# Patient Record
Sex: MALE | Race: WHITE | NOT HISPANIC OR LATINO | Employment: OTHER | URBAN - NONMETROPOLITAN AREA
[De-identification: names, ages, dates, MRNs, and addresses within clinical notes are randomized per-mention and may not be internally consistent; named-entity substitution may affect disease eponyms.]

---

## 2021-05-26 ENCOUNTER — APPOINTMENT (EMERGENCY)
Dept: CT IMAGING | Facility: HOSPITAL | Age: 86
End: 2021-05-26
Payer: MEDICARE

## 2021-05-26 ENCOUNTER — APPOINTMENT (EMERGENCY)
Dept: RADIOLOGY | Facility: HOSPITAL | Age: 86
End: 2021-05-26
Payer: MEDICARE

## 2021-05-26 ENCOUNTER — HOSPITAL ENCOUNTER (EMERGENCY)
Facility: HOSPITAL | Age: 86
Discharge: HOME | End: 2021-05-26
Attending: EMERGENCY MEDICINE
Payer: MEDICARE

## 2021-05-26 VITALS
TEMPERATURE: 96.2 F | DIASTOLIC BLOOD PRESSURE: 51 MMHG | SYSTOLIC BLOOD PRESSURE: 107 MMHG | HEART RATE: 66 BPM | OXYGEN SATURATION: 100 % | RESPIRATION RATE: 14 BRPM | WEIGHT: 124.56 LBS

## 2021-05-26 DIAGNOSIS — S51.001A AVULSION OF SKIN OF ELBOW, RIGHT, INITIAL ENCOUNTER: ICD-10-CM
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
## 2021-05-26 DIAGNOSIS — N18.9 CKD (CHRONIC KIDNEY DISEASE): ICD-10-CM

## 2021-05-26 DIAGNOSIS — S09.90XA INJURY OF HEAD, INITIAL ENCOUNTER: ICD-10-CM

## 2021-05-26 DIAGNOSIS — D72.829 LEUKOCYTOSIS: ICD-10-CM

## 2021-05-26 LAB
ANION GAP SERPL CALCULATED.3IONS-SCNC: 8 MMOL/L (ref 4–13)
APTT PPP: 32 SECONDS (ref 23–37)
BASOPHILS # BLD AUTO: 0.12 THOUSANDS/ΜL (ref 0–0.1)
BASOPHILS NFR BLD AUTO: 1 % (ref 0–1)
BUN SERPL-MCNC: 31 MG/DL (ref 5–25)
CALCIUM SERPL-MCNC: 9.4 MG/DL (ref 8.3–10.1)
CHLORIDE SERPL-SCNC: 107 MMOL/L (ref 100–108)
CO2 SERPL-SCNC: 26 MMOL/L (ref 21–32)
CREAT SERPL-MCNC: 1.84 MG/DL (ref 0.6–1.3)
EOSINOPHIL # BLD AUTO: 0.29 THOUSAND/ΜL (ref 0–0.61)
EOSINOPHIL NFR BLD AUTO: 2 % (ref 0–6)
ERYTHROCYTE [DISTWIDTH] IN BLOOD BY AUTOMATED COUNT: 12.4 % (ref 11.6–15.1)
GFR SERPL CREATININE-BSD FRML MDRD: 33 ML/MIN/1.73SQ M
GLUCOSE SERPL-MCNC: 139 MG/DL (ref 65–140)
HCT VFR BLD AUTO: 37.7 % (ref 36.5–49.3)
HGB BLD-MCNC: 12.3 G/DL (ref 12–17)
IMM GRANULOCYTES # BLD AUTO: 0.07 THOUSAND/UL (ref 0–0.2)
IMM GRANULOCYTES NFR BLD AUTO: 0 % (ref 0–2)
INR PPP: 1.24 (ref 0.84–1.19)
LYMPHOCYTES # BLD AUTO: 3.3 THOUSANDS/ΜL (ref 0.6–4.47)
LYMPHOCYTES NFR BLD AUTO: 21 % (ref 14–44)
MCH RBC QN AUTO: 33.2 PG (ref 26.8–34.3)
MCHC RBC AUTO-ENTMCNC: 32.6 G/DL (ref 31.4–37.4)
MCV RBC AUTO: 102 FL (ref 82–98)
MONOCYTES # BLD AUTO: 0.93 THOUSAND/ΜL (ref 0.17–1.22)
MONOCYTES NFR BLD AUTO: 6 % (ref 4–12)
NEUTROPHILS # BLD AUTO: 10.91 THOUSANDS/ΜL (ref 1.85–7.62)
NEUTS SEG NFR BLD AUTO: 70 % (ref 43–75)
NRBC BLD AUTO-RTO: 0 /100 WBCS
PLATELET # BLD AUTO: 243 THOUSANDS/UL (ref 149–390)
PMV BLD AUTO: 9.4 FL (ref 8.9–12.7)
POTASSIUM SERPL-SCNC: 4 MMOL/L (ref 3.5–5.3)
PROTHROMBIN TIME: 15.4 SECONDS (ref 11.6–14.5)
RBC # BLD AUTO: 3.7 MILLION/UL (ref 3.88–5.62)
SODIUM SERPL-SCNC: 141 MMOL/L (ref 136–145)
WBC # BLD AUTO: 15.62 THOUSAND/UL (ref 4.31–10.16)

## 2021-05-26 PROCEDURE — 71045 X-RAY EXAM CHEST 1 VIEW: CPT

## 2021-05-26 PROCEDURE — 80048 BASIC METABOLIC PNL TOTAL CA: CPT | Performed by: EMERGENCY MEDICINE

## 2021-05-26 PROCEDURE — 93005 ELECTROCARDIOGRAM TRACING: CPT

## 2021-05-26 PROCEDURE — 99285 EMERGENCY DEPT VISIT HI MDM: CPT | Performed by: EMERGENCY MEDICINE

## 2021-05-26 PROCEDURE — 70450 CT HEAD/BRAIN W/O DYE: CPT

## 2021-05-26 PROCEDURE — 85610 PROTHROMBIN TIME: CPT | Performed by: EMERGENCY MEDICINE

## 2021-05-26 PROCEDURE — 85025 COMPLETE CBC W/AUTO DIFF WBC: CPT | Performed by: EMERGENCY MEDICINE

## 2021-05-26 PROCEDURE — 36415 COLL VENOUS BLD VENIPUNCTURE: CPT | Performed by: EMERGENCY MEDICINE

## 2021-05-26 PROCEDURE — 72125 CT NECK SPINE W/O DYE: CPT

## 2021-05-26 PROCEDURE — 85730 THROMBOPLASTIN TIME PARTIAL: CPT | Performed by: EMERGENCY MEDICINE

## 2021-05-26 PROCEDURE — 73080 X-RAY EXAM OF ELBOW: CPT

## 2021-05-26 PROCEDURE — 72170 X-RAY EXAM OF PELVIS: CPT

## 2021-05-26 PROCEDURE — 99284 EMERGENCY DEPT VISIT MOD MDM: CPT

## 2021-05-26 RX ORDER — LEVOTHYROXINE SODIUM 0.07 MG/1
75 TABLET ORAL
COMMUNITY

## 2021-05-26 RX ORDER — LISINOPRIL 10 MG/1
10 TABLET ORAL DAILY
COMMUNITY

## 2021-05-26 RX ORDER — MEMANTINE HYDROCHLORIDE 10 MG/1
10 TABLET ORAL 2 TIMES DAILY
COMMUNITY

## 2021-05-26 RX ORDER — MIRTAZAPINE 7.5 MG/1
7.5 TABLET, FILM COATED ORAL
COMMUNITY

## 2021-05-26 RX ORDER — TRIAMCINOLONE ACETONIDE 0.25 MG/G
1 CREAM TOPICAL 2 TIMES DAILY
COMMUNITY

## 2021-05-27 NOTE — ED PROVIDER NOTES
Emergency Department Trauma Note  Iwona Truong 82 y o  male MRN: [id number]  Unit/Bed#: ED 02/ED 02 Encounter: [id number]      Trauma Alert: Trauma Acuity: Trauma Evaluation  Model of Arrival: Mode of Arrival: ALS via Trauma Squad Name and Number: [facility]  Trauma Team: Current Providers  Attending Provider: Judeen Lesches, DO  Registered Nurse: Sam Salter RN  Consultants: None      History of Present Illness     Chief Complaint:   Chief Complaint   Patient presents with    Fall     Patient from home, hx of dementia  Was assisted to shower by son and then placed on toilet  Patient fell off toilet, skin tear right elbow and bruising near right ear  HPI:  Iwona Truong is a 82 y o  male who presents with fall  Mechanism:Details of Incident: Patient from home, hx of dementia  Was assisted to shower by son and then placed on toilet  Patient fell off toilet, skin tear right elbow and bruising near right ear  Injury Date: 05/26/21 Injury Time: 2045 Injury Occurence Location - [address] [place] [address] Way: [facility]    Patient is an 53-year-old male with history of dementia on no anticoagulants presents the emergency department after unwitnessed fall either while walking or from the toilet the patient's son reports that he had given his father a shower and then sat him on the toilet and then was out of the room when he heard a thud found father on the floor with a skin tear on the right elbow no other obvious injury or trauma  Patient ambulatory since the fall no complaint or sign of pain or trauma other than the skin tear on the right elbow  History provided by:  Patient and relative  Fall  Mechanism of injury: fall    Injury location:  Shoulder/arm  Shoulder/arm injury location:  R elbow  Time since incident:  1 hour    Review of Systems   All other systems reviewed and are negative  Historical Information     Immunizations: There is no immunization history on file for this patient      Past Medical History:   Diagnosis Date    Dementia ([facility] [address] )     Hypertension      History reviewed  No pertinent family history  History reviewed  No pertinent surgical history  Social History     Tobacco Use    Smoking status: Never Smoker    Smokeless tobacco: Never Used   Substance Use Topics    Alcohol use: Not Currently    Drug use: Never     E-Cigarette/Vaping    E-Cigarette Use Never User      E-Cigarette/Vaping Substances       Family History: non-contributory    Meds/Allergies   Prior to Admission Medications   Prescriptions Last Dose Informant Patient Reported?  Taking?   levothyroxine 75 mcg tablet 5/26/2021 at Unknown time  Yes Yes   Sig: Take 75 mcg by mouth daily in the early morning   lisinopril (ZESTRIL) 10 mg tablet 5/26/2021 at Unknown time  Yes Yes   Sig: Take 10 mg by mouth daily   memantine (NAMENDA) 10 mg tablet 5/26/2021 at Unknown time  Yes Yes   Sig: Take 10 mg by mouth 2 (two) times a day   mirtazapine (REMERON) 7 5 MG tablet 5/26/2021 at Unknown time  Yes Yes   Sig: Take 7 5 mg by mouth daily at bedtime   triamcinolone (KENALOG) 0 025 % cream 5/26/2021 at Unknown time  Yes Yes   Sig: Apply 1 application topically 2 (two) times a day      Facility-Administered Medications: None       Allergies   Allergen Reactions    Sulfamethoxazole Other (See Comments)     Other reaction(s): SULFAMETHOXAZOLE (BACTRIM)    Sulfamethoxazole-Trimethoprim Other (See Comments)    Tetracycline Other (See Comments)     Other reaction(s): TETRACYCLINE    Trimethoprim Other (See Comments)     Other reaction(s): TRIMETHOPRIM (BACTRIM)       PHYSICAL EXAM    PE limited by: none    Objective   Vitals:   First set: Temperature: (!) 96 2 °F (35 7 °C) (05/26/21 2130)  Pulse: 68 (05/26/21 2130)  Respirations: 14 (05/26/21 2130)  Blood Pressure: 98/71 (05/26/21 2130)  SpO2: 95 % (05/26/21 2130)    Primary Survey:   (A) Airway: intact  (B) Breathing: clear b/l BS  (C) Circulation: Pulses:   normal  (D) Disabliity:  GCS Total:  14  (E) Expose:  Completed    Secondary Survey: (Click on Physical Exam tab above)  Physical Exam  Vitals signs and nursing note reviewed  Constitutional:       Appearance: He is well-developed  HENT:      Head: Normocephalic and atraumatic  Right Ear: External ear normal       Left Ear: External ear normal       Nose: Nose normal    Eyes:      Conjunctiva/sclera: Conjunctivae normal       Pupils: Pupils are equal, round, and reactive to light  Neck:      Musculoskeletal: Normal range of motion and neck supple  Cardiovascular:      Rate and Rhythm: Normal rate and regular rhythm  Heart sounds: Normal heart sounds  Pulmonary:      Effort: Pulmonary effort is normal  No respiratory distress  Breath sounds: Normal breath sounds  No wheezing or rales  Chest:      Chest wall: No tenderness  Abdominal:      General: Bowel sounds are normal  There is no distension  Palpations: Abdomen is soft  Tenderness: There is no abdominal tenderness  There is no guarding  Musculoskeletal: Normal range of motion  Right elbow: He exhibits normal range of motion and no deformity  Tenderness found  Comments: Skin avulsion right elbow dressed  Skin:     General: Skin is warm and dry  Neurological:      Mental Status: He is alert  Cranial Nerves: No cranial nerve deficit  Sensory: No sensory deficit  Cervical spine cleared by clinical criteria?  No (imaging required)      Invasive Devices     Peripheral Intravenous Line            Peripheral IV 05/26/21 Left Forearm less than 1 day                Lab Results:   Results Reviewed     Procedure Component Value Units Date/Time    Protime-INR [[id number]]  (Abnormal) Collected: 05/26/21 2136    Lab Status: Final result Specimen: Blood from Arm, Left Updated: 05/26/21 2153     Protime 15 4 seconds      INR 1 24    APTT [[id number]]  (Normal) Collected: 05/26/21 2136    Lab Status: Final result Specimen: Blood from Arm, Left Updated: 05/26/21 2153     PTT 32 seconds     Basic metabolic panel [[id number]]  (Abnormal) Collected: 05/26/21 2136    Lab Status: Final result Specimen: Blood from Arm, Left Updated: 05/26/21 2151     Sodium 141 mmol/L      Potassium 4 0 mmol/L      Chloride 107 mmol/L      CO2 26 mmol/L      ANION GAP 8 mmol/L      BUN 31 mg/dL      Creatinine 1 84 mg/dL      Glucose 139 mg/dL      Calcium 9 4 mg/dL      eGFR 33 ml/min/1 73sq m     Narrative:      [place] guidelines for Chronic Kidney Disease (CKD):     Stage 1 with normal or high GFR (GFR > 90 mL/min/1 73 square meters)    Stage 2 Mild CKD (GFR = 60-89 mL/min/1 73 square meters)    Stage 3A Moderate CKD (GFR = 45-59 mL/min/1 73 square meters)    Stage 3B Moderate CKD (GFR = 30-44 mL/min/1 73 square meters)    Stage 4 Severe CKD (GFR = 15-29 mL/min/1 73 square meters)    Stage 5 End Stage CKD (GFR <15 mL/min/1 73 square meters)  Note: GFR calculation is accurate only with a steady state creatinine    CBC and differential [[id number]]  (Abnormal) Collected: 05/26/21 2136    Lab Status: Final result Specimen: Blood from Arm, Left Updated: 05/26/21 2140     WBC 15 62 Thousand/uL      RBC 3 70 Million/uL      Hemoglobin 12 3 g/dL      Hematocrit 37 7 %       fL      MCH 33 2 pg      MCHC 32 6 g/dL      RDW 12 4 %      MPV 9 4 fL      Platelets 551 Thousands/uL      nRBC 0 /100 WBCs      Neutrophils Relative 70 %      Immat GRANS % 0 %      Lymphocytes Relative 21 %      Monocytes Relative 6 %      Eosinophils Relative 2 %      Basophils Relative 1 %      Neutrophils Absolute 10 91 Thousands/µL      Immature Grans Absolute 0 07 Thousand/uL      Lymphocytes Absolute 3 30 Thousands/µL      Monocytes Absolute 0 93 Thousand/µL      Eosinophils Absolute 0 29 Thousand/µL      Basophils Absolute 0 12 Thousands/µL                  Imaging Studies:   Direct to CT: No  XR elbow 3+ vw RIGHT   ED Interpretation by Judeen Lesches, DO (05/26 2209)   No acute fracture or dislocation      TRAUMA - CT spine cervical wo contrast   Final Result by Kimberlee Valle MD (05/26 2211)      No cervical spine fracture or traumatic malalignment  Workstation performed: [id number]         TRAUMA - CT head wo contrast   Final Result by Kimberlee Valle MD (05/26 2212)      No intracranial hemorrhage or calvarial fracture  Workstation performed: [id number]         XR Trauma chest portable   ED Interpretation by Cinthia Coley DO (05/26 2209)   No acute disease      XR Trauma pelvis ap only 1 or 2 vw   ED Interpretation by Cinthia Coley DO (05/26 2216)   No acute osseous abnormality            Procedures  ECG 12 Lead Documentation Only    Date/Time: 5/26/2021 9:34 PM  Performed by: Cinthia Coley DO  Authorized by: Cinthia Coley DO     ECG reviewed by me, the ED Provider: yes    Patient location:  ED  Previous ECG:     Comparison to cardiac monitor: Yes    Quality:     Tracing quality:  Limited by artifact  Rate:     ECG rate:  68    ECG rate assessment: normal    Rhythm:     Rhythm: sinus rhythm    QRS:     QRS axis:  Right    QRS intervals:  Normal  Conduction:     Conduction: normal    ST segments:     ST segments:  Non-specific  T waves:     T waves: non-specific               ED Course  ED Course as of May 26 2238   Wed May 26, 2021   2236 Cervical Collar Clearance: The patient had a CT scan of the cervical spine demonstrating no acute injury  On exam, the patient had no midline point tenderness or paresthesias/numbness/weakness in the extremities  The patient had full range of motion (was then able to flex, extend, and rotate head laterally) without pain  There were no distracting injuries and the patient was not intoxicated  The patient's cervical spine was cleared radiologically and clinically  Cervical collar removed at this time       Cinthia Coley DO  5/26/2021 10:36 PM                   MDM  Number of Diagnoses or Management Options  Avulsion of skin of elbow, right, initial encounter: new and requires workup  CKD (chronic kidney disease): new and requires workup  Fall, initial encounter: new and requires workup  Injury of head, initial encounter: new and requires workup  Leukocytosis: new and requires workup  Diagnosis management comments: Patient remained clinically and hemodynamically stable in the emergency department normal nonfocal neurologic examination patient is at baseline per son and feels well wishes to return home after workup in the ED revealed no evidence of acute significant traumatic injury patient's son made aware of findings in the ED of leukocytosis and chronic kidney disease blood work no evidence of acute infection or sepsis clinically  Patient's son wishes to return home with patient at this point  Advised supportive care for now and local wound care for skin avulsion and follow up promptly for further evaluation and treatment and re-evaluation and obtain test results  Return precautions and anticipatory guidance discussed           Amount and/or Complexity of Data Reviewed  Clinical lab tests: ordered and reviewed  Tests in the radiology section of CPT®: ordered and reviewed  Tests in the medicine section of CPT®: reviewed and ordered  Decide to obtain previous medical records or to obtain history from someone other than the patient: yes  Review and summarize past medical records: yes  Independent visualization of images, tracings, or specimens: yes    Risk of Complications, Morbidity, and/or Mortality  Presenting problems: moderate  Diagnostic procedures: moderate  Management options: moderate    Patient Progress  Patient progress: stable          Disposition  [facility] Transfer: No  Final diagnoses:   Fall, initial encounter   Avulsion of skin of elbow, right, initial encounter   Injury of head, initial encounter   CKD (chronic kidney disease)   Leukocytosis     Time reflects when diagnosis was documented in both MDM as applicable and the Disposition within this note     Time User Action Codes Description Comment    5/26/2021 10:25 PM Lisette Colla Add [M75  XERI] Fall, initial encounter     5/26/2021 10:27 PM Desmond Reina Add [S51 001A] Avulsion of skin of elbow, right, initial encounter     5/26/2021 10:27 PM Lisette Colla Add [S09 90XA] Injury of head, initial encounter     5/26/2021 10:37 PM Lisette Colla Add [N18 9] CKD (chronic kidney disease)     5/26/2021 10:37 PM Lisette Colla Add [A81 185] Leukocytosis       ED Disposition     ED Disposition Condition Date/Time Comment    Discharge Stable Wed May 26, 2021 10:25 PM [place] No discharge to home/self care  Follow-up Information     Follow up With Specialties Details Why Contact Info    Veneda Bernheim, MD [facility] Medicine Schedule an appointment as soon as possible for a visit in 3 days  [address] [place] [address] [address]  [phone]          Patient's Medications   Discharge Prescriptions    No medications on file     No discharge procedures on file      PDMP Review     None          ED Provider  Electronically Signed by         Tania Drew DO  05/26/21 [id number]

## 2021-05-30 LAB
ATRIAL RATE: 68 BPM
P AXIS: 84 DEGREES
PR INTERVAL: 148 MS
QRS AXIS: 87 DEGREES
QRSD INTERVAL: 86 MS
QT INTERVAL: 428 MS
QTC INTERVAL: 455 MS
T WAVE AXIS: 69 DEGREES
VENTRICULAR RATE: 68 BPM

## 2021-05-30 PROCEDURE — 93010 ELECTROCARDIOGRAM REPORT: CPT | Performed by: INTERNAL MEDICINE

## 2023-02-01 ENCOUNTER — APPOINTMENT (EMERGENCY)
Dept: CT IMAGING | Facility: HOSPITAL | Age: 88
End: 2023-02-01

## 2023-02-01 ENCOUNTER — APPOINTMENT (EMERGENCY)
Dept: RADIOLOGY | Facility: HOSPITAL | Age: 88
End: 2023-02-01

## 2023-02-01 ENCOUNTER — HOSPITAL ENCOUNTER (EMERGENCY)
Facility: HOSPITAL | Age: 88
Discharge: HOME | End: 2023-02-01
Attending: EMERGENCY MEDICINE

## 2023-02-01 VITALS
BODY MASS INDEX: 17.58 KG/M2 | TEMPERATURE: 97 F | HEIGHT: 67 IN | RESPIRATION RATE: 20 BRPM | SYSTOLIC BLOOD PRESSURE: 119 MMHG | WEIGHT: 112 LBS | OXYGEN SATURATION: 100 % | DIASTOLIC BLOOD PRESSURE: 59 MMHG | HEART RATE: 63 BPM
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
## 2023-02-01 DIAGNOSIS — T14.8XXA ABRASION: ICD-10-CM

## 2023-02-01 LAB
ANION GAP SERPL CALCULATED.3IONS-SCNC: 5 MMOL/L (ref 4–13)
BASOPHILS # BLD AUTO: 0.06 THOUSANDS/ÂΜL (ref 0–0.1)
BASOPHILS NFR BLD AUTO: 1 % (ref 0–1)
BUN SERPL-MCNC: 28 MG/DL (ref 5–25)
CALCIUM SERPL-MCNC: 9.7 MG/DL (ref 8.4–10.2)
CHLORIDE SERPL-SCNC: 105 MMOL/L (ref 96–108)
CO2 SERPL-SCNC: 31 MMOL/L (ref 21–32)
CREAT SERPL-MCNC: 1.64 MG/DL (ref 0.6–1.3)
EOSINOPHIL # BLD AUTO: 0.17 THOUSAND/ÂΜL (ref 0–0.61)
EOSINOPHIL NFR BLD AUTO: 2 % (ref 0–6)
ERYTHROCYTE [DISTWIDTH] IN BLOOD BY AUTOMATED COUNT: 13.6 % (ref 11.6–15.1)
GFR SERPL CREATININE-BSD FRML MDRD: 37 ML/MIN/1.73SQ M
GLUCOSE SERPL-MCNC: 166 MG/DL (ref 65–140)
HCT VFR BLD AUTO: 41 % (ref 36.5–49.3)
HGB BLD-MCNC: 13.2 G/DL (ref 12–17)
IMM GRANULOCYTES # BLD AUTO: 0.04 THOUSAND/UL (ref 0–0.2)
IMM GRANULOCYTES NFR BLD AUTO: 1 % (ref 0–2)
LYMPHOCYTES # BLD AUTO: 1.26 THOUSANDS/ÂΜL (ref 0.6–4.47)
LYMPHOCYTES NFR BLD AUTO: 17 % (ref 14–44)
MCH RBC QN AUTO: 32.5 PG (ref 26.8–34.3)
MCHC RBC AUTO-ENTMCNC: 32.2 G/DL (ref 31.4–37.4)
MCV RBC AUTO: 101 FL (ref 82–98)
MONOCYTES # BLD AUTO: 0.67 THOUSAND/ÂΜL (ref 0.17–1.22)
MONOCYTES NFR BLD AUTO: 9 % (ref 4–12)
NEUTROPHILS # BLD AUTO: 5.21 THOUSANDS/ÂΜL (ref 1.85–7.62)
NEUTS SEG NFR BLD AUTO: 70 % (ref 43–75)
NRBC BLD AUTO-RTO: 0 /100 WBCS
PLATELET # BLD AUTO: 225 THOUSANDS/UL (ref 149–390)
PMV BLD AUTO: 9.8 FL (ref 8.9–12.7)
POTASSIUM SERPL-SCNC: 4.5 MMOL/L (ref 3.5–5.3)
RBC # BLD AUTO: 4.06 MILLION/UL (ref 3.88–5.62)
SODIUM SERPL-SCNC: 141 MMOL/L (ref 135–147)
WBC # BLD AUTO: 7.41 THOUSAND/UL (ref 4.31–10.16)

## 2023-02-01 RX ADMIN — TETANUS TOXOID, REDUCED DIPHTHERIA TOXOID AND ACELLULAR PERTUSSIS VACCINE, ADSORBED 0.5 ML: 5; 2.5; 8; 8; 2.5 SUSPENSION INTRAMUSCULAR at 12:54

## 2023-02-01 NOTE — ED PROVIDER NOTES
Emergency Department Trauma Note  Finesse Hutchison 82 y o  male MRN: [id number]  Unit/Bed#: ED 05/ED 05 Encounter: [id number]      Trauma Alert: Trauma Acuity: Trauma Evaluation  Model of Arrival:   via    Trauma Team: Current Providers  Attending Provider: Ginger Casas DO  Registered Nurse: Mayuri Ramos RN  Consultants:     None      History of Present Illness     Chief Complaint:   Chief Complaint   Patient presents with   • Fall     Pt arrives from home after possibly slipping on ice in driveway and falling  +headstrike, +asa, +right knee injury  Son answering questions as pt has alzheimer's and is answering minimal questions      HPI:  Finesse Hutchison is a 82 y o  male who presents with   Mechanism:Details of Incident: pt walking outside to get newspaper in driveway and thinks slipped on ice falling forward hitting face, hands, and knees  Injury Date: 02/01/23 Injury Time: 0930 Injury Occurence Location - [address] [place] [address] Way: marcia    68-year-old male presents emergency room with his son with reported the patient having fallen while walking outside to get the paper  Struck his head and face  Also landed on his right knee  Unknown if there was loss of consciousness  Patient has underlying diagnosis of dementia and has caregivers at the home  Does take a daily aspirin        History provided by:  Relative  Fall  Mechanism of injury: fall    Injury location:  Head/neck and leg  Head/neck injury location:  Head  Leg injury location:  R knee  Incident location:  Outdoors  Time since incident:  2 hours  Arrived directly from scene: yes    Fall:     Fall occurred:  Unable to specify    Impact surface:  [employer] of impact:  Unable to specify  Tetanus status:  Out of date  Prior to arrival data:     Responsiveness at scene:  Alert  Risk factors: no AICD, no anticoagulation therapy and no diabetes    Risk factors comment:  Antiplatelet therapy    Review of Systems   Unable to perform ROS: Dementia   Skin: Positive for wound  Historical Information     Immunizations:   Immunization History   Administered Date(s) Administered   • COVID-19 MODERNA VACC 0 5 ML IM 01/27/2021, 02/24/2021, 11/20/2021, 05/21/2022   • Tdap 02/01/2023       Past Medical History:   Diagnosis Date   • Dementia ([facility] [address] )    • Hypertension      No family history on file  No past surgical history on file  Social History     Tobacco Use   • Smoking status: Never   • Smokeless tobacco: Never   Vaping Use   • Vaping Use: Never used   Substance Use Topics   • Alcohol use: Not Currently   • Drug use: Never     E-Cigarette/Vaping   • E-Cigarette Use Never User      E-Cigarette/Vaping Substances       Family History: non-contributory    Meds/Allergies   Prior to Admission Medications   Prescriptions Last Dose Informant Patient Reported?  Taking?   levothyroxine 75 mcg tablet   Yes No   Sig: Take 75 mcg by mouth daily in the early morning   lisinopril (ZESTRIL) 10 mg tablet   Yes No   Sig: Take 10 mg by mouth daily   memantine (NAMENDA) 10 mg tablet   Yes No   Sig: Take 10 mg by mouth 2 (two) times a day   mirtazapine (REMERON) 7 5 MG tablet   Yes No   Sig: Take 7 5 mg by mouth daily at bedtime   triamcinolone (KENALOG) 0 025 % cream   Yes No   Sig: Apply 1 application topically 2 (two) times a day      Facility-Administered Medications: None       Allergies   Allergen Reactions   • Sulfamethoxazole Other (See Comments)     Other reaction(s): SULFAMETHOXAZOLE (BACTRIM)   • Sulfamethoxazole-Trimethoprim Other (See Comments)   • Tetracycline Other (See Comments)     Other reaction(s): TETRACYCLINE   • Trimethoprim Other (See Comments)     Other reaction(s): TRIMETHOPRIM (BACTRIM)       PHYSICAL EXAM    PE limited by: Dementia    Objective   Vitals:   First set: Temperature: (!) 97 °F (36 1 °C) (02/01/23 1132)  Pulse: 61 (02/01/23 1132)  Respirations: 17 (02/01/23 1132)  Blood Pressure: 129/62 (02/01/23 1132)  SpO2: 97 % (02/01/23 1132)    Primary Survey: (A) Airway: Normal  (B) Breathing: Normal  (C) Circulation: Pulses:   normal  (D) Disabliity:  GCS Total:  14  (E) Expose:  Completed    Secondary Survey: (Click on Physical Exam tab above)  Physical Exam  Vitals and nursing note reviewed  Constitutional:       General: He is not in acute distress  Appearance: Normal appearance  He is well-developed and normal weight  He is not ill-appearing or toxic-appearing  HENT:      Head: Normocephalic  Abrasion and contusion present  No laceration  Hair is normal       Jaw: No pain on movement  Right Ear: External ear normal       Left Ear: External ear normal       Nose: Nose normal  No congestion  Mouth/Throat:      Mouth: Mucous membranes are moist       Pharynx: Oropharynx is clear  Eyes:      General: Lids are normal  No scleral icterus  Extraocular Movements: Extraocular movements intact  Conjunctiva/sclera: Conjunctivae normal       Pupils: Pupils are equal, round, and reactive to light  Cardiovascular:      Rate and Rhythm: Normal rate and regular rhythm  Pulses: Normal pulses  Heart sounds: Normal heart sounds  No murmur heard  Pulmonary:      Effort: Pulmonary effort is normal  No respiratory distress  Breath sounds: Normal breath sounds  No decreased breath sounds, wheezing, rhonchi or rales  Abdominal:      General: Abdomen is flat  There is no distension  Palpations: Abdomen is soft  Abdomen is not rigid  Tenderness: There is no abdominal tenderness  There is no guarding or rebound  Musculoskeletal:         General: Tenderness and signs of injury present  No swelling or deformity  Normal range of motion  Cervical back: Normal range of motion and neck supple  Right upper leg: Normal       Left upper leg: Normal       Right knee: No swelling, deformity or lacerations  Normal range of motion  Tenderness present  Left knee: No swelling, deformity or lacerations  Normal range of motion   No tenderness  Right lower leg: Normal       Left lower leg: Normal       Right ankle: Normal       Left ankle: Normal         Legs:    Skin:     General: Skin is warm and dry  Coloration: Skin is not pale  Findings: No rash  Neurological:      General: No focal deficit present  Mental Status: He is alert  Mental status is at baseline  He is disoriented  Motor: Motor function is intact  Psychiatric:         Attention and Perception: Attention normal          Mood and Affect: Mood normal          Speech: Speech normal          Behavior: Behavior normal          Cervical spine cleared by clinical criteria?  No (imaging required)      Invasive Devices     Peripheral Intravenous Line  Duration           Peripheral IV 02/01/23 Right Antecubital <1 day                Lab Results:   Results Reviewed     Procedure Component Value Units Date/Time    Basic metabolic panel [[id number]]  (Abnormal) Collected: 02/01/23 1150    Lab Status: Final result Specimen: Blood from Arm, Right Updated: 02/01/23 1208     Sodium 141 mmol/L      Potassium 4 5 mmol/L      Chloride 105 mmol/L      CO2 31 mmol/L      ANION GAP 5 mmol/L      BUN 28 mg/dL      Creatinine 1 64 mg/dL      Glucose 166 mg/dL      Calcium 9 7 mg/dL      eGFR 37 ml/min/1 73sq m     Narrative:      [place] guidelines for Chronic Kidney Disease (CKD):   •  Stage 1 with normal or high GFR (GFR > 90 mL/min/1 73 square meters)  •  Stage 2 Mild CKD (GFR = 60-89 mL/min/1 73 square meters)  •  Stage 3A Moderate CKD (GFR = 45-59 mL/min/1 73 square meters)  •  Stage 3B Moderate CKD (GFR = 30-44 mL/min/1 73 square meters)  •  Stage 4 Severe CKD (GFR = 15-29 mL/min/1 73 square meters)  •  Stage 5 End Stage CKD (GFR <15 mL/min/1 73 square meters)  Note: GFR calculation is accurate only with a steady state creatinine    CBC and differential [[id number]]  (Abnormal) Collected: 02/01/23 1150    Lab Status: Final result Specimen: Blood from Arm, Right Updated: 02/01/23 1154     WBC 7 41 Thousand/uL      RBC 4 06 Million/uL      Hemoglobin 13 2 g/dL      Hematocrit 41 0 %       fL      MCH 32 5 pg      MCHC 32 2 g/dL      RDW 13 6 %      MPV 9 8 fL      Platelets 472 Thousands/uL      nRBC 0 /100 WBCs      Neutrophils Relative 70 %      Immat GRANS % 1 %      Lymphocytes Relative 17 %      Monocytes Relative 9 %      Eosinophils Relative 2 %      Basophils Relative 1 %      Neutrophils Absolute 5 21 Thousands/µL      Immature Grans Absolute 0 04 Thousand/uL      Lymphocytes Absolute 1 26 Thousands/µL      Monocytes Absolute 0 67 Thousand/µL      Eosinophils Absolute 0 17 Thousand/µL      Basophils Absolute 0 06 Thousands/µL                  Imaging Studies:   Direct to CT: Yes  XR knee 4+ vw right injury   ED Interpretation by Sierra Dawn DO (02/01 1223)   Cute fracture or dislocation      TRAUMA - CT head wo contrast   Final Result by Izzy De La Fuente MD (02/01 1211)      No acute intracranial abnormality  Workstation performed: [id number]         TRAUMA - CT spine cervical wo contrast   Final Result by Izzy De La Fuente MD (02/01 1221)      No cervical spine fracture or traumatic malalignment  Workstation performed: [id number]         TRAUMA - CT facial bones wo contrast   Final Result by Izzy De La Fuente MD (02/01 1217)      No fracture identified                 Workstation performed: [id number]               Procedures  ECG 12 Lead Documentation Only    Date/Time: 2/1/2023 12:10 PM  Performed by: Sierra Dawn DO  Authorized by: Sierra Dawn DO     ECG reviewed by me, the ED Provider: yes    Patient location:  ED  Previous ECG:     Previous ECG:  Compared to current    Comparison ECG info:  5/26/2021    Similarity:  No change  Interpretation:     Interpretation: normal    Quality:     Tracing quality:  Limited by artifact  Rate:     ECG rate assessment: normal    Rhythm:     Rhythm: sinus rhythm    Ectopy:     Ectopy: none    QRS:     QRS axis:  Normal  Conduction:     Conduction: normal    ST segments:     ST segments:  Non-specific  T waves:     T waves: non-specific               ED Course  ED Course as of 02/01/23 1311   Wed Feb 01, 2023   1221 Renal function at baseline   1222 CT imaging is negative   1223 Imaging negative for acute process  If patient is ambulatory he may be discharged  1308 Updated the son at bedside  He is comfortable with this discharge plan  Medical Decision Making  Patient presented to the emergency department and a MSE was performed  The patient was evaluated for complaint related to acute traumatic injury  Differential diagnoses included, but are not limited to, acute head injury including intracranial hemorrhage, subdural or epidural hematoma, cervical spine injury, other spine injury, chest trauma such as pneumothorax, pulmonary contusion, or cardiac contusion, abdominal injury such as liver hematoma, spleen hematoma, kidney hematoma, or other musculoskeletal injury  Several of these diagnoses have been evaluated and ruled out by history and physical   As needed, patient will be further evaluated with laboratory and imaging studies  Higher level diagnostics may also be required  Please see work-up portion of the note for further evaluation of patient's risk  Socioeconomic factors were also considered as part of the decision-making process  Abrasion: complicated acute illness or injury  Fall, initial encounter: complicated acute illness or injury  Amount and/or Complexity of Data Reviewed  Labs: ordered  Radiology: ordered and independent interpretation performed  Risk  Prescription drug management  Decision regarding hospitalization                    Disposition  [facility] Transfer: No  Final diagnoses:   Fall, initial encounter   Abrasion     Time reflects when diagnosis was documented in both MDM as applicable and the Disposition within this note     Time User Action Codes Description Comment    2/1/2023 12:49 PM Arelia Tanzanian Add [I28  LXSK] Fall, initial encounter     2/1/2023 12:49 PM Arelia Tanzanian Add Gonzalo Barnard  [address] Abrasion       ED Disposition     ED Disposition   Discharge    Condition   Stable    Date/Time   Wed Feb 1, 2023 12:48 PM    Comment   Bill Jones Lease discharge to home/self care  Follow-up Information     Follow up With Specialties Details Why [address], [place] Family Medicine In 1 week As needed [address] [facility] [address]  [phone]          Discharge Medication List as of 2/1/2023 12:49 PM      CONTINUE these medications which have NOT CHANGED    Details   levothyroxine 75 mcg tablet Take 75 mcg by mouth daily in the early morning, Historical Med      lisinopril (ZESTRIL) 10 mg tablet Take 10 mg by mouth daily, Historical Med      memantine (NAMENDA) 10 mg tablet Take 10 mg by mouth 2 (two) times a day, Historical Med      mirtazapine (REMERON) 7 5 MG tablet Take 7 5 mg by mouth daily at bedtime, Historical Med      triamcinolone (KENALOG) 0 025 % cream Apply 1 application topically 2 (two) times a day, Historical Med           No discharge procedures on file      PDMP Review     None          ED Provider  Electronically Signed by         Erich Oneal DO  02/01/23 [id number]

## 2023-02-01 NOTE — TRAUMA DOCUMENTATION
Assumed care of patient at this time, patient settled in litter by previous RN, without any needs at this time

## 2023-02-06 LAB
QRS AXIS: 103 DEGREES
QRSD INTERVAL: 78 MS
QT INTERVAL: 386 MS
QTC INTERVAL: 404 MS
T WAVE AXIS: 63 DEGREES
VENTRICULAR RATE: 66 BPM